# Patient Record
Sex: MALE | Race: OTHER | HISPANIC OR LATINO | URBAN - METROPOLITAN AREA
[De-identification: names, ages, dates, MRNs, and addresses within clinical notes are randomized per-mention and may not be internally consistent; named-entity substitution may affect disease eponyms.]

---

## 2021-05-29 ENCOUNTER — INPATIENT (INPATIENT)
Facility: HOSPITAL | Age: 31
LOS: 2 days | Discharge: HOME | DRG: 603 | End: 2021-06-01
Attending: INTERNAL MEDICINE | Admitting: INTERNAL MEDICINE
Payer: MEDICAID

## 2021-05-29 VITALS
TEMPERATURE: 98 F | WEIGHT: 182.98 LBS | RESPIRATION RATE: 16 BRPM | DIASTOLIC BLOOD PRESSURE: 88 MMHG | OXYGEN SATURATION: 99 % | HEART RATE: 90 BPM | SYSTOLIC BLOOD PRESSURE: 133 MMHG

## 2021-05-29 DIAGNOSIS — M70.22 OLECRANON BURSITIS, LEFT ELBOW: ICD-10-CM

## 2021-05-29 LAB
A1C WITH ESTIMATED AVERAGE GLUCOSE RESULT: 5.4 % — SIGNIFICANT CHANGE UP (ref 4–5.6)
ALBUMIN SERPL ELPH-MCNC: 3.2 G/DL — LOW (ref 3.5–5)
ALBUMIN SERPL ELPH-MCNC: 3.5 G/DL — SIGNIFICANT CHANGE UP (ref 3.5–5)
ALP SERPL-CCNC: 100 U/L — SIGNIFICANT CHANGE UP (ref 40–120)
ALP SERPL-CCNC: 88 U/L — SIGNIFICANT CHANGE UP (ref 40–120)
ALT FLD-CCNC: 22 U/L DA — SIGNIFICANT CHANGE UP (ref 10–60)
ALT FLD-CCNC: 25 U/L DA — SIGNIFICANT CHANGE UP (ref 10–60)
AMMONIA BLD-MCNC: 43 UMOL/L — HIGH (ref 11–32)
ANION GAP SERPL CALC-SCNC: 10 MMOL/L — SIGNIFICANT CHANGE UP (ref 5–17)
ANION GAP SERPL CALC-SCNC: 5 MMOL/L — SIGNIFICANT CHANGE UP (ref 5–17)
APPEARANCE UR: CLEAR — SIGNIFICANT CHANGE UP
AST SERPL-CCNC: 14 U/L — SIGNIFICANT CHANGE UP (ref 10–40)
AST SERPL-CCNC: 14 U/L — SIGNIFICANT CHANGE UP (ref 10–40)
BACTERIA # UR AUTO: ABNORMAL /HPF
BASOPHILS # BLD AUTO: 0.03 K/UL — SIGNIFICANT CHANGE UP (ref 0–0.2)
BASOPHILS # BLD AUTO: 0.04 K/UL — SIGNIFICANT CHANGE UP (ref 0–0.2)
BASOPHILS NFR BLD AUTO: 0.2 % — SIGNIFICANT CHANGE UP (ref 0–2)
BASOPHILS NFR BLD AUTO: 0.4 % — SIGNIFICANT CHANGE UP (ref 0–2)
BILIRUB SERPL-MCNC: 1 MG/DL — SIGNIFICANT CHANGE UP (ref 0.2–1.2)
BILIRUB SERPL-MCNC: 1.3 MG/DL — HIGH (ref 0.2–1.2)
BILIRUB UR-MCNC: NEGATIVE — SIGNIFICANT CHANGE UP
BUN SERPL-MCNC: 12 MG/DL — SIGNIFICANT CHANGE UP (ref 7–18)
BUN SERPL-MCNC: 14 MG/DL — SIGNIFICANT CHANGE UP (ref 7–18)
CALCIUM SERPL-MCNC: 8.7 MG/DL — SIGNIFICANT CHANGE UP (ref 8.4–10.5)
CALCIUM SERPL-MCNC: 9.4 MG/DL — SIGNIFICANT CHANGE UP (ref 8.4–10.5)
CHLORIDE SERPL-SCNC: 103 MMOL/L — SIGNIFICANT CHANGE UP (ref 96–108)
CHLORIDE SERPL-SCNC: 105 MMOL/L — SIGNIFICANT CHANGE UP (ref 96–108)
CHOLEST SERPL-MCNC: 159 MG/DL — SIGNIFICANT CHANGE UP
CO2 SERPL-SCNC: 25 MMOL/L — SIGNIFICANT CHANGE UP (ref 22–31)
CO2 SERPL-SCNC: 29 MMOL/L — SIGNIFICANT CHANGE UP (ref 22–31)
COLOR SPEC: YELLOW — SIGNIFICANT CHANGE UP
CREAT SERPL-MCNC: 0.87 MG/DL — SIGNIFICANT CHANGE UP (ref 0.5–1.3)
CREAT SERPL-MCNC: 0.89 MG/DL — SIGNIFICANT CHANGE UP (ref 0.5–1.3)
CRP SERPL-MCNC: 90 MG/L — HIGH
DIFF PNL FLD: ABNORMAL
EOSINOPHIL # BLD AUTO: 0.13 K/UL — SIGNIFICANT CHANGE UP (ref 0–0.5)
EOSINOPHIL # BLD AUTO: 0.16 K/UL — SIGNIFICANT CHANGE UP (ref 0–0.5)
EOSINOPHIL NFR BLD AUTO: 1 % — SIGNIFICANT CHANGE UP (ref 0–6)
EOSINOPHIL NFR BLD AUTO: 1.5 % — SIGNIFICANT CHANGE UP (ref 0–6)
EPI CELLS # UR: ABNORMAL /HPF
ERYTHROCYTE [SEDIMENTATION RATE] IN BLOOD: 38 MM/HR — HIGH (ref 0–15)
ESTIMATED AVERAGE GLUCOSE: 108 MG/DL — SIGNIFICANT CHANGE UP (ref 68–114)
ETHANOL SERPL-MCNC: <3 MG/DL — SIGNIFICANT CHANGE UP (ref 0–10)
FERRITIN SERPL-MCNC: 867 NG/ML — HIGH (ref 30–400)
FOLATE SERPL-MCNC: 16.9 NG/ML — SIGNIFICANT CHANGE UP
GLUCOSE SERPL-MCNC: 108 MG/DL — HIGH (ref 70–99)
GLUCOSE SERPL-MCNC: 83 MG/DL — SIGNIFICANT CHANGE UP (ref 70–99)
GLUCOSE UR QL: NEGATIVE — SIGNIFICANT CHANGE UP
HCT VFR BLD CALC: 39.3 % — SIGNIFICANT CHANGE UP (ref 39–50)
HCT VFR BLD CALC: 42.8 % — SIGNIFICANT CHANGE UP (ref 39–50)
HDLC SERPL-MCNC: 40 MG/DL — LOW
HGB BLD-MCNC: 13 G/DL — SIGNIFICANT CHANGE UP (ref 13–17)
HGB BLD-MCNC: 14.2 G/DL — SIGNIFICANT CHANGE UP (ref 13–17)
IMM GRANULOCYTES NFR BLD AUTO: 0.4 % — SIGNIFICANT CHANGE UP (ref 0–1.5)
IMM GRANULOCYTES NFR BLD AUTO: 0.5 % — SIGNIFICANT CHANGE UP (ref 0–1.5)
IRON SATN MFR SERPL: 14 % — LOW (ref 20–55)
IRON SATN MFR SERPL: 29 UG/DL — LOW (ref 65–170)
KETONES UR-MCNC: ABNORMAL
LACTATE SERPL-SCNC: 1.3 MMOL/L — SIGNIFICANT CHANGE UP (ref 0.7–2)
LEUKOCYTE ESTERASE UR-ACNC: ABNORMAL
LIPID PNL WITH DIRECT LDL SERPL: 93 MG/DL — SIGNIFICANT CHANGE UP
LYMPHOCYTES # BLD AUTO: 1.92 K/UL — SIGNIFICANT CHANGE UP (ref 1–3.3)
LYMPHOCYTES # BLD AUTO: 17.3 % — SIGNIFICANT CHANGE UP (ref 13–44)
LYMPHOCYTES # BLD AUTO: 17.4 % — SIGNIFICANT CHANGE UP (ref 13–44)
LYMPHOCYTES # BLD AUTO: 2.16 K/UL — SIGNIFICANT CHANGE UP (ref 1–3.3)
MAGNESIUM SERPL-MCNC: 2.4 MG/DL — SIGNIFICANT CHANGE UP (ref 1.6–2.6)
MCHC RBC-ENTMCNC: 30.1 PG — SIGNIFICANT CHANGE UP (ref 27–34)
MCHC RBC-ENTMCNC: 30.5 PG — SIGNIFICANT CHANGE UP (ref 27–34)
MCHC RBC-ENTMCNC: 33.1 GM/DL — SIGNIFICANT CHANGE UP (ref 32–36)
MCHC RBC-ENTMCNC: 33.2 GM/DL — SIGNIFICANT CHANGE UP (ref 32–36)
MCV RBC AUTO: 90.7 FL — SIGNIFICANT CHANGE UP (ref 80–100)
MCV RBC AUTO: 92.3 FL — SIGNIFICANT CHANGE UP (ref 80–100)
MONOCYTES # BLD AUTO: 1.13 K/UL — HIGH (ref 0–0.9)
MONOCYTES # BLD AUTO: 1.31 K/UL — HIGH (ref 0–0.9)
MONOCYTES NFR BLD AUTO: 10.3 % — SIGNIFICANT CHANGE UP (ref 2–14)
MONOCYTES NFR BLD AUTO: 10.5 % — SIGNIFICANT CHANGE UP (ref 2–14)
NEUTROPHILS # BLD AUTO: 7.71 K/UL — HIGH (ref 1.8–7.4)
NEUTROPHILS # BLD AUTO: 8.79 K/UL — HIGH (ref 1.8–7.4)
NEUTROPHILS NFR BLD AUTO: 69.9 % — SIGNIFICANT CHANGE UP (ref 43–77)
NEUTROPHILS NFR BLD AUTO: 70.6 % — SIGNIFICANT CHANGE UP (ref 43–77)
NITRITE UR-MCNC: NEGATIVE — SIGNIFICANT CHANGE UP
NON HDL CHOLESTEROL: 119 MG/DL — SIGNIFICANT CHANGE UP
NRBC # BLD: 0 /100 WBCS — SIGNIFICANT CHANGE UP (ref 0–0)
NRBC # BLD: 0 /100 WBCS — SIGNIFICANT CHANGE UP (ref 0–0)
NT-PROBNP SERPL-SCNC: 33 PG/ML — SIGNIFICANT CHANGE UP (ref 0–125)
PH UR: 6 — SIGNIFICANT CHANGE UP (ref 5–8)
PHOSPHATE SERPL-MCNC: 2.9 MG/DL — SIGNIFICANT CHANGE UP (ref 2.5–4.5)
PLATELET # BLD AUTO: 160 K/UL — SIGNIFICANT CHANGE UP (ref 150–400)
PLATELET # BLD AUTO: 182 K/UL — SIGNIFICANT CHANGE UP (ref 150–400)
POTASSIUM SERPL-MCNC: 3.7 MMOL/L — SIGNIFICANT CHANGE UP (ref 3.5–5.3)
POTASSIUM SERPL-MCNC: 4.6 MMOL/L — SIGNIFICANT CHANGE UP (ref 3.5–5.3)
POTASSIUM SERPL-SCNC: 3.7 MMOL/L — SIGNIFICANT CHANGE UP (ref 3.5–5.3)
POTASSIUM SERPL-SCNC: 4.6 MMOL/L — SIGNIFICANT CHANGE UP (ref 3.5–5.3)
PROT SERPL-MCNC: 7.3 G/DL — SIGNIFICANT CHANGE UP (ref 6–8.3)
PROT SERPL-MCNC: 8.3 G/DL — SIGNIFICANT CHANGE UP (ref 6–8.3)
PROT UR-MCNC: 15
RBC # BLD: 4.26 M/UL — SIGNIFICANT CHANGE UP (ref 4.2–5.8)
RBC # BLD: 4.26 M/UL — SIGNIFICANT CHANGE UP (ref 4.2–5.8)
RBC # BLD: 4.72 M/UL — SIGNIFICANT CHANGE UP (ref 4.2–5.8)
RBC # FLD: 12.6 % — SIGNIFICANT CHANGE UP (ref 10.3–14.5)
RBC # FLD: 12.7 % — SIGNIFICANT CHANGE UP (ref 10.3–14.5)
RBC CASTS # UR COMP ASSIST: ABNORMAL /HPF (ref 0–2)
RETICS #: 52 K/UL — SIGNIFICANT CHANGE UP (ref 25–125)
RETICS/RBC NFR: 1.2 % — SIGNIFICANT CHANGE UP (ref 0.5–2.5)
SARS-COV-2 RNA SPEC QL NAA+PROBE: SIGNIFICANT CHANGE UP
SODIUM SERPL-SCNC: 138 MMOL/L — SIGNIFICANT CHANGE UP (ref 135–145)
SODIUM SERPL-SCNC: 139 MMOL/L — SIGNIFICANT CHANGE UP (ref 135–145)
SP GR SPEC: 1.02 — SIGNIFICANT CHANGE UP (ref 1.01–1.02)
TIBC SERPL-MCNC: 200 UG/DL — LOW (ref 250–450)
TRIGL SERPL-MCNC: 129 MG/DL — SIGNIFICANT CHANGE UP
TSH SERPL-MCNC: 1.33 UU/ML — SIGNIFICANT CHANGE UP (ref 0.34–4.82)
UIBC SERPL-MCNC: 171 UG/DL — SIGNIFICANT CHANGE UP (ref 110–370)
URATE SERPL-MCNC: 5.8 MG/DL — SIGNIFICANT CHANGE UP (ref 3.4–8.8)
UROBILINOGEN FLD QL: 1
VIT B12 SERPL-MCNC: <150 PG/ML — LOW (ref 232–1245)
WBC # BLD: 11.02 K/UL — HIGH (ref 3.8–10.5)
WBC # BLD: 12.47 K/UL — HIGH (ref 3.8–10.5)
WBC # FLD AUTO: 11.02 K/UL — HIGH (ref 3.8–10.5)
WBC # FLD AUTO: 12.47 K/UL — HIGH (ref 3.8–10.5)
WBC UR QL: SIGNIFICANT CHANGE UP /HPF (ref 0–5)

## 2021-05-29 PROCEDURE — 99285 EMERGENCY DEPT VISIT HI MDM: CPT

## 2021-05-29 PROCEDURE — 93971 EXTREMITY STUDY: CPT | Mod: 26,LT

## 2021-05-29 PROCEDURE — 73080 X-RAY EXAM OF ELBOW: CPT | Mod: 26,LT

## 2021-05-29 RX ORDER — OXYCODONE HYDROCHLORIDE 5 MG/1
5 TABLET ORAL EVERY 8 HOURS
Refills: 0 | Status: DISCONTINUED | OUTPATIENT
Start: 2021-05-29 | End: 2021-06-01

## 2021-05-29 RX ORDER — ACETAMINOPHEN 500 MG
650 TABLET ORAL EVERY 4 HOURS
Refills: 0 | Status: DISCONTINUED | OUTPATIENT
Start: 2021-05-29 | End: 2021-06-01

## 2021-05-29 RX ORDER — AMPICILLIN SODIUM AND SULBACTAM SODIUM 250; 125 MG/ML; MG/ML
INJECTION, POWDER, FOR SUSPENSION INTRAMUSCULAR; INTRAVENOUS
Refills: 0 | Status: DISCONTINUED | OUTPATIENT
Start: 2021-05-29 | End: 2021-06-01

## 2021-05-29 RX ORDER — AMPICILLIN SODIUM AND SULBACTAM SODIUM 250; 125 MG/ML; MG/ML
3 INJECTION, POWDER, FOR SUSPENSION INTRAMUSCULAR; INTRAVENOUS ONCE
Refills: 0 | Status: COMPLETED | OUTPATIENT
Start: 2021-05-29 | End: 2021-05-29

## 2021-05-29 RX ORDER — KETOROLAC TROMETHAMINE 30 MG/ML
30 SYRINGE (ML) INJECTION ONCE
Refills: 0 | Status: DISCONTINUED | OUTPATIENT
Start: 2021-05-29 | End: 2021-05-29

## 2021-05-29 RX ORDER — AMPICILLIN SODIUM AND SULBACTAM SODIUM 250; 125 MG/ML; MG/ML
3 INJECTION, POWDER, FOR SUSPENSION INTRAMUSCULAR; INTRAVENOUS EVERY 6 HOURS
Refills: 0 | Status: DISCONTINUED | OUTPATIENT
Start: 2021-05-30 | End: 2021-06-01

## 2021-05-29 RX ORDER — TRAMADOL HYDROCHLORIDE 50 MG/1
50 TABLET ORAL EVERY 6 HOURS
Refills: 0 | Status: DISCONTINUED | OUTPATIENT
Start: 2021-05-29 | End: 2021-06-01

## 2021-05-29 RX ORDER — SODIUM CHLORIDE 9 MG/ML
1000 INJECTION INTRAMUSCULAR; INTRAVENOUS; SUBCUTANEOUS ONCE
Refills: 0 | Status: COMPLETED | OUTPATIENT
Start: 2021-05-29 | End: 2021-05-29

## 2021-05-29 RX ADMIN — SODIUM CHLORIDE 1000 MILLILITER(S): 9 INJECTION INTRAMUSCULAR; INTRAVENOUS; SUBCUTANEOUS at 16:07

## 2021-05-29 RX ADMIN — Medication 30 MILLIGRAM(S): at 11:08

## 2021-05-29 RX ADMIN — Medication 30 MILLIGRAM(S): at 10:46

## 2021-05-29 RX ADMIN — AMPICILLIN SODIUM AND SULBACTAM SODIUM 3 GRAM(S): 250; 125 INJECTION, POWDER, FOR SUSPENSION INTRAMUSCULAR; INTRAVENOUS at 12:19

## 2021-05-29 RX ADMIN — AMPICILLIN SODIUM AND SULBACTAM SODIUM 200 GRAM(S): 250; 125 INJECTION, POWDER, FOR SUSPENSION INTRAMUSCULAR; INTRAVENOUS at 23:34

## 2021-05-29 RX ADMIN — AMPICILLIN SODIUM AND SULBACTAM SODIUM 200 GRAM(S): 250; 125 INJECTION, POWDER, FOR SUSPENSION INTRAMUSCULAR; INTRAVENOUS at 10:55

## 2021-05-29 NOTE — ED ADULT NURSE NOTE - NSIMPLEMENTINTERV_GEN_ALL_ED
Implemented All Universal Safety Interventions:  [place] to call system. Call bell, personal items and telephone within reach. Instruct patient to call for assistance. Room bathroom lighting operational. Non-slip footwear when patient is off stretcher. Physically safe environment: no spills, clutter or unnecessary equipment. Stretcher in lowest position, wheels locked, appropriate side rails in place.

## 2021-05-29 NOTE — ED PROVIDER NOTE - CLINICAL SUMMARY MEDICAL DECISION MAKING FREE TEXT BOX
Likely bursitis and cellulitis but with significant extension. Will consider admission. Will get basic blood work, cultures, X-ray of elbow and give Unasyn antibiotics.

## 2021-05-29 NOTE — H&P ADULT - NSHPPHYSICALEXAM_GEN_ALL_CORE
Vital Signs Last 24 Hrs  T(C): 36.6 (29 May 2021 09:44), Max: 36.6 (29 May 2021 09:44)  T(F): 97.8 (29 May 2021 09:44), Max: 97.8 (29 May 2021 09:44)  HR: 90 (29 May 2021 09:44) (90 - 90)  BP: 133/88 (29 May 2021 09:44) (133/88 - 133/88)  BP(mean): --  RR: 16 (29 May 2021 09:44) (16 - 16)  SpO2: 99% (29 May 2021 09:44) (99% - 99%)    PHYSICAL EXAM:  GENERAL: NAD, speaks in full sentences, no signs of respiratory distress  HEAD:  Atraumatic, Normocephalic  EYES: EOMI, PERRLA, conjunctiva and sclera clear  NECK: Supple, No JVD  CHEST/LUNG: Clear to auscultation bilaterally; No wheeze; No crackles; No accessory muscles used  HEART: Regular rate and rhythm; No murmurs;   ABDOMEN: Soft, Nontender, Nondistended; Bowel sounds present; No guarding  EXTREMITIES:  2+ Peripheral Pulses, No cyanosis or edema  PSYCH: AAOx3  NEUROLOGY: non-focal  SKIN: No rashes or lesions Vital Signs Last 24 Hrs  T(C): 36.6 (29 May 2021 09:44), Max: 36.6 (29 May 2021 09:44)  T(F): 97.8 (29 May 2021 09:44), Max: 97.8 (29 May 2021 09:44)  HR: 90 (29 May 2021 09:44) (90 - 90)  BP: 133/88 (29 May 2021 09:44) (133/88 - 133/88)  BP(mean): --  RR: 16 (29 May 2021 09:44) (16 - 16)  SpO2: 99% (29 May 2021 09:44) (99% - 99%)    PHYSICAL EXAM:  GENERAL: NAD, speaks in full sentences, no signs of respiratory distress  HEAD:  Atraumatic, Normocephalic  EYES: EOMI, PERRLA, conjunctiva and sclera clear  NECK: Supple, No JVD  CHEST/LUNG: ; No wheeze; No crackles; No accessory muscles used  HEART: Regular rate and rhythm; No murmurs;   ABDOMEN: Soft, Nontender, Nondistended; Bowel sounds present; No guarding  EXTREMITIES:  2+ Peripheral Pulses, left arm redness 6 by 4 inches around elbow area, slightly warm. 8/10 pain, extension at elbow limited  PSYCH: AAOx3  NEUROLOGY: no-focal deficit  SKIN: No rashes or lesions

## 2021-05-29 NOTE — H&P ADULT - PROBLEM SELECTOR PLAN 3
RISK                                                          Points  [] Previous VTE                                           3  [] Thrombophilia                                        2  [] Lower limb paralysis                              2   [] Current Cancer                                       2   [x] Immobilization > 24 hrs                        1  [] ICU/CCU stay > 24 hours                       1  [] Age > 60                                                   1    score 1

## 2021-05-29 NOTE — H&P ADULT - HISTORY OF PRESENT ILLNESS
Patient is 30 year old Male with no significant PMHx/PSHx, received COVID-19 vaccine to Left shoulder 5 days ago, states the following morning noticed Left elbow redness and swelling. Patient also relates subjective chills but no other symptoms. Able to  move elbow in all range of motion but with pain to the elbow. Pt denies trauma, injuries, insect bite, burn or any other stimulus. Pt denies any chest pain, nausea, vomiting, diarrhoea, dizziness.  Patient is 30 year old Male with no significant PMHx/PSHx, received COVID-19 vaccine to Left shoulder 5 days ago in am of monday and in evening on same day hit his elbow at work and reports following morning noticed Left elbow redness and swelling. Patient also relates subjective chills but no other symptoms. Able to  move elbow in all range of motion but with pain to the elbow and cannot fully extend elbow. Pt denies insect bite, burn or any other stimulus. Pt denies any chest pain, nausea, vomiting, diarrhoea, dizziness.

## 2021-05-29 NOTE — CONSULT NOTE ADULT - ASSESSMENT
Patient is a 30y old  Male with no significant PMHx/PSHx, received COVID-19 vaccine to Left shoulder 5 days ago in am of Monday and in evening on same day hit his elbow at work and reports following morning noticed Left elbow redness and swelling. Patient also relates subjective chills but no other symptoms. Able to  move elbow in all range of motion but with pain to the elbow and cannot fully extend elbow. Pt denies insect bite, burn or any other stimulus. ON admission, he has no fever but Leukocytosis. The Xray of Left elbow shows Marked soft tissue swelling olecranon region and posterior forearm. Correlate clinically for bursitis and/or cellulitis. He has starte don Unasyn, and the ID consult requested to assist with further evaluation and antibiotic management.     # Left Elbow cellulitis  # Leukocytosis    would recommend:    1. Follow up Blood cultures  2. Keep Left elbow elevated  3. Continue Unasyn until work up is done    will follow the patient with you and make further recommendation based on the clinical course and Lab results  Thank you for the opportunity to participate in Mr. KEY's care      Attending Attestation::    Spent more than 65 minutes on total encounter, more than 50 % of the visit was spent counseling and/or coordinating care by the Attending physician.         Patient is a 30y old  Male with no significant PMHx/PSHx, received COVID-19 vaccine to Left shoulder 5 days ago in am of Monday and in evening on same day hit his elbow at work and reports following morning noticed Left elbow redness and swelling. Patient also relates subjective chills but no other symptoms. Able to  move elbow in all range of motion but with pain to the elbow and cannot fully extend elbow. Pt denies insect bite, burn or any other stimulus. ON admission, he has no fever but Leukocytosis. The Xray of Left elbow shows Marked soft tissue swelling olecranon region and posterior forearm. Correlate clinically for bursitis and/or cellulitis. He has starte don Unasyn, and the ID consult requested to assist with further evaluation and antibiotic management.     # Left Elbow cellulitis /Bursitis   # Leukocytosis    would recommend:    1. Follow up Blood cultures  2. Keep Left elbow elevated  3. Continue Unasyn  and Add Clindamycin until work up is done  4. Pain management as needed     will follow the patient with you and make further recommendation based on the clinical course and Lab results  Thank you for the opportunity to participate in Mr. KEY's care      Attending Attestation::    Spent more than 65 minutes on total encounter, more than 50 % of the visit was spent counseling and/or coordinating care by the Attending physician.

## 2021-05-29 NOTE — ED PROVIDER NOTE - OBJECTIVE STATEMENT
29 y/o M with no significant PMHx/PSHx, received COVID-19 vaccine to L shoulder 5 days ago, states the following morning noticed L elbow redness and swelling. Patient also relates subjective chills but no other symptoms. Able to range and move elbow but with pain to the elbow. Denies trauma, injuries or any other acute complaints.

## 2021-05-29 NOTE — ED PROVIDER NOTE - PROGRESS NOTE DETAILS
Spoke with collin Simmons on call. Agrees with plan and recommends likely admission; will let ortho PA know and will be seen in the ED. Reevaluated at bedside, agree w admission. Mother requested for Dr Parry (working w her PMD - Dr Kaleigh Ramirez). Discussed w Dr Parry, agree w plan. Dr Lin contacted again, will see pt in the hospital, he will contact Ortho PA for consult/ MAR aware

## 2021-05-29 NOTE — CONSULT NOTE ADULT - SUBJECTIVE AND OBJECTIVE BOX
Patient is a 30y old  Male with no significant PMHx/PSHx, received COVID-19 vaccine to Left shoulder 5 days ago in am of Monday and in evening on same day hit his elbow at work and reports following morning noticed Left elbow redness and swelling. Patient also relates subjective chills but no other symptoms. Able to  move elbow in all range of motion but with pain to the elbow and cannot fully extend elbow. Pt denies insect bite, burn or any other stimulus. ON admission, he has no fever but Leukocytosis. The Xray of Left elbow shows Marked soft tissue swelling olecranon region and posterior forearm. Correlate clinically for bursitis and/or cellulitis. He has starte don Unasyn, and the ID consult requested to assist with further evaluation and antibiotic management.       REVIEW OF SYSTEMS: Total of twelve systems have been reviewed with patient and found to be negative unless mentioned in HPI      PAST MEDICAL & SURGICAL HISTORY:  No pertinent past medical history      SOCIAL HISTORY  Alcohol: Does not drink  Tobacco: Does not smoke  Illicit substance use: None      FAMILY HISTORY: Non contributory to the present illness      ALLERGIES: No Known Allergies      Vital Signs Last 24 Hrs  T(C): 37.2 (29 May 2021 15:50), Max: 37.2 (29 May 2021 15:50)  T(F): 98.9 (29 May 2021 15:50), Max: 98.9 (29 May 2021 15:50)  HR: 69 (29 May 2021 15:50) (69 - 90)  BP: 98/54 (29 May 2021 15:50) (98/54 - 133/88)  BP(mean): --  RR: 16 (29 May 2021 15:50) (16 - 16)  SpO2: 97% (29 May 2021 15:50) (97% - 99%)      PHYSICAL EXAM:  GENERAL: Not in distress   CHEST/LUNG: Not using accessory muscles  HEART: s1 and s2 present  ABDOMEN:  Nontender and  Nondistended  EXTREMITIES: No pedal  edema  CNS: Awake and Alert      LABS:                        13.0   11.02 )-----------( 160      ( 29 May 2021 17:59 )             39.3           138  |  103  |  12  ----------------------------<  83  4.6   |  25  |  0.89    Ca    9.4      29 May 2021 10:54    TPro  8.3  /  Alb  3.5  /  TBili  1.3<H>  /  DBili  x   /  AST  14  /  ALT  25  /  AlkPhos  100        Urinalysis Basic - ( 29 May 2021 16:17 )  Color: Yellow / Appearance: Clear / S.025 / pH: x  Gluc: x / Ketone: Trace  / Bili: Negative / Urobili: 1   Blood: x / Protein: 15 / Nitrite: Negative   Leuk Esterase: Trace / RBC: 5-10 /HPF / WBC 3-5 /HPF   Sq Epi: x / Non Sq Epi: Occasional /HPF / Bacteria: Trace /HPF        MEDICATIONS  (STANDING):  ampicillin/sulbactam  IVPB        MEDICATIONS  (PRN):  acetaminophen   Tablet .. 650 milliGRAM(s) Oral every 4 hours PRN Mild Pain (1 - 3)  oxyCODONE    IR 5 milliGRAM(s) Oral every 8 hours PRN Severe Pain (7 - 10)  traMADol 50 milliGRAM(s) Oral every 6 hours PRN Moderate Pain (4 - 6)        RADIOLOGY & ADDITIONAL TESTS:    21: US Duplex Venous Upper Ext Ltd, Left (21 @ 16:56) >No evidence of LEFT upper extremity deep venous thrombosis.    21 : Xray Elbow AP + Lateral + Oblique, Left (21 @ 10:37) No fracture or dislocation. Joint spaces preserved. No focal bone destruction. Marked soft tissue swelling olecranon region and posterior forearm. Correlate clinically for bursitis and/or cellulitis. No soft tissue gas or opaque foreign body.      MICROBIOLOGY DATA:    COVID-19 PCR . (21 @ 10:54)   COVID-19 PCR: Rebecateloc           Patient is a 30y old  Male with no significant PMHx/PSHx, received COVID-19 vaccine to Left shoulder 5 days ago in am of Monday and in evening on same day hit his elbow at work and reports following morning noticed Left elbow redness and swelling. Patient also relates subjective chills but no other symptoms. Able to  move elbow in all range of motion but with pain to the elbow and cannot fully extend elbow. Pt denies insect bite, burn or any other stimulus. ON admission, he has no fever but Leukocytosis. The Xray of Left elbow shows Marked soft tissue swelling olecranon region and posterior forearm. Correlate clinically for bursitis and/or cellulitis. He has starte don Unasyn, and the ID consult requested to assist with further evaluation and antibiotic management.       REVIEW OF SYSTEMS: Total of twelve systems have been reviewed with patient and found to be negative unless mentioned in HPI      PAST MEDICAL & SURGICAL HISTORY:  No pertinent past medical history      SOCIAL HISTORY  Alcohol: Does not drink  Tobacco: Does not smoke  Illicit substance use: None      FAMILY HISTORY: Non contributory to the present illness      ALLERGIES: No Known Allergies      Vital Signs Last 24 Hrs  T(C): 37.2 (29 May 2021 15:50), Max: 37.2 (29 May 2021 15:50)  T(F): 98.9 (29 May 2021 15:50), Max: 98.9 (29 May 2021 15:50)  HR: 69 (29 May 2021 15:50) (69 - 90)  BP: 98/54 (29 May 2021 15:50) (98/54 - 133/88)  BP(mean): --  RR: 16 (29 May 2021 15:50) (16 - 16)  SpO2: 97% (29 May 2021 15:50) (97% - 99%)      PHYSICAL EXAM:  GENERAL: Not in distress   CHEST/LUNG: Not using accessory muscles  HEART: s1 and s2 present  ABDOMEN:  Nontender and  Nondistended  EXTREMITIES: Left elbow red, swollen, warmth and tender to touch   CNS: Awake and Alert      LABS:                        13.0   11.02 )-----------( 160      ( 29 May 2021 17:59 )             39.3       05-    138  |  103  |  12  ----------------------------<  83  4.6   |  25  |  0.89    Ca    9.4      29 May 2021 10:54    TPro  8.3  /  Alb  3.5  /  TBili  1.3<H>  /  DBili  x   /  AST  14  /  ALT  25  /  AlkPhos  100  05-      Urinalysis Basic - ( 29 May 2021 16:17 )  Color: Yellow / Appearance: Clear / S.025 / pH: x  Gluc: x / Ketone: Trace  / Bili: Negative / Urobili: 1   Blood: x / Protein: 15 / Nitrite: Negative   Leuk Esterase: Trace / RBC: 5-10 /HPF / WBC 3-5 /HPF   Sq Epi: x / Non Sq Epi: Occasional /HPF / Bacteria: Trace /HPF        MEDICATIONS  (STANDING):  ampicillin/sulbactam  IVPB        MEDICATIONS  (PRN):  acetaminophen   Tablet .. 650 milliGRAM(s) Oral every 4 hours PRN Mild Pain (1 - 3)  oxyCODONE    IR 5 milliGRAM(s) Oral every 8 hours PRN Severe Pain (7 - 10)  traMADol 50 milliGRAM(s) Oral every 6 hours PRN Moderate Pain (4 - 6)        RADIOLOGY & ADDITIONAL TESTS:    21: US Duplex Venous Upper Ext Ltd, Left (21 @ 16:56) >No evidence of LEFT upper extremity deep venous thrombosis.    21 : Xray Elbow AP + Lateral + Oblique, Left (21 @ 10:37) No fracture or dislocation. Joint spaces preserved. No focal bone destruction. Marked soft tissue swelling olecranon region and posterior forearm. Correlate clinically for bursitis and/or cellulitis. No soft tissue gas or opaque foreign body.      MICROBIOLOGY DATA:    COVID-19 PCR . (21 @ 10:54)   COVID-19 PCR: NotDetec

## 2021-05-29 NOTE — H&P ADULT - PROBLEM SELECTOR PLAN 2
- p/w B/L  Cellulitis  of left arm  - warm to touch with some tenderness  - s/p unasyn in ED  - Leukocytosis 12, tachy to 90s, lactate 1.3 with known source meet sepsis criterion  - will continue unasyn  - Recommended arm elevation  - lactate 1.3  - c/w pain meds Tylenol q6 while awake   - f/u Doppler to r/o DVT  - f/u ESR , CRP  - f/u Blood Cx (Specimen received)  - f/u Urine Cx (Specimen received)  - f/u PT.   - f/u ortho  - ID dR. AMIN

## 2021-05-29 NOTE — ED PROVIDER NOTE - MUSCULOSKELETAL MINIMAL EXAM
diffuse swelling to posterior aspect of L elbow with mild effusion around the olecranon, redness, induration and warmth extends from mid arm to posterior aspect and mid forearm, full range of motion in elbow with flexion, extension, supination and pronation without pain, neurovascularly intact

## 2021-05-29 NOTE — H&P ADULT - ASSESSMENT
Patient is 30 year old Male with no significant PMHx/PSHx, received COVID-19 vaccine to Left shoulder 5 days ago, states the following morning noticed Left elbow redness and swelling. Pt admitted for cellulitis Patient is 30 year old Male with no significant PMHx/PSHx, received COVID-19 vaccine to Left shoulder 5 days ago, states the following morning noticed Left elbow redness and swelling. Pt admitted for cellulitis.

## 2021-05-29 NOTE — ED PROVIDER NOTE - CARE PLAN
Principal Discharge DX:	Olecranon bursitis of left elbow  Secondary Diagnosis:	Cellulitis of left upper extremity

## 2021-05-29 NOTE — H&P ADULT - ATTENDING COMMENTS
Patient is 30 year old Male with no significant PMHx/PSHx, received COVID-19 vaccine to Left shoulder 5 days ago in am of monday and in evening on same day hit his elbow at work and reports following morning noticed Left elbow redness and swelling. Patient also relates subjective chills but no other symptoms. Able to  move elbow in all range of motion but with pain to the elbow and cannot fully extend elbow. Pt denies insect bite, burn or any other stimulus. Pt denies any chest pain, nausea, vomiting, diarrhoea, dizziness.       assessment  --  sepsis, left ue cellulitis, left olecron bursitis, h/o COVID-19 vaccine to Left shoulder 5 days ago    plan  --  admit to med, unasyn, motrin prn pain gi and dvt prophylaxis,  cbc, bmp, mg, phos, lipids, tsh, bld cx, ua, ucx,      id cons  ortho cons

## 2021-05-29 NOTE — H&P ADULT - PROBLEM SELECTOR PLAN 1
- p/w B/L  Cellulitis  of left arm  - warm to touch with some tenderness  - s/p unasyn in ED  - Leukocytosis 12, tachy to 90s, lactate 1.3 with known source meet sepsis criterion  - will continue unasyn  - Recommended arm elevation  - lactate 1.3  - c/w pain meds Tylenol q6 while awake   - f/u Doppler to r/o DVT  - f/u ESR , CRP  - f/u Blood Cx (Specimen received)  - f/u Urine Cx (Specimen received)  - f/u PT.   - f/u ortho  - ID dR. AMIN - p/w B/L  Cellulitis  of left arm  - warm to touch with some tenderness  - s/p unasyn in ED  - Leukocytosis 12, tachy to 90s, lactate 1.3 with known source meet sepsis criterion  - will continue unasyn  - Recommended arm elevation  - lactate 1.3  - c/w pain meds Tylenol q6 while awake   - f/u Doppler to r/o DVT  - f/u ESR , CRP  - f/u Blood Cx (Specimen received)  - f/u Urine Cx (Specimen received)  - f/u PT.   - f/u ortho, consulted by ed   - ID dR. BEAN

## 2021-05-30 LAB
ALBUMIN SERPL ELPH-MCNC: 3.1 G/DL — LOW (ref 3.5–5)
ALP SERPL-CCNC: 83 U/L — SIGNIFICANT CHANGE UP (ref 40–120)
ALT FLD-CCNC: 20 U/L DA — SIGNIFICANT CHANGE UP (ref 10–60)
ANION GAP SERPL CALC-SCNC: 6 MMOL/L — SIGNIFICANT CHANGE UP (ref 5–17)
AST SERPL-CCNC: 14 U/L — SIGNIFICANT CHANGE UP (ref 10–40)
BASOPHILS # BLD AUTO: 0.04 K/UL — SIGNIFICANT CHANGE UP (ref 0–0.2)
BASOPHILS NFR BLD AUTO: 0.4 % — SIGNIFICANT CHANGE UP (ref 0–2)
BILIRUB SERPL-MCNC: 1.1 MG/DL — SIGNIFICANT CHANGE UP (ref 0.2–1.2)
BUN SERPL-MCNC: 10 MG/DL — SIGNIFICANT CHANGE UP (ref 7–18)
CALCIUM SERPL-MCNC: 8.8 MG/DL — SIGNIFICANT CHANGE UP (ref 8.4–10.5)
CHLORIDE SERPL-SCNC: 107 MMOL/L — SIGNIFICANT CHANGE UP (ref 96–108)
CO2 SERPL-SCNC: 26 MMOL/L — SIGNIFICANT CHANGE UP (ref 22–31)
COVID-19 SPIKE DOMAIN AB INTERP: POSITIVE
COVID-19 SPIKE DOMAIN ANTIBODY RESULT: >250 U/ML — HIGH
CREAT SERPL-MCNC: 0.74 MG/DL — SIGNIFICANT CHANGE UP (ref 0.5–1.3)
CULTURE RESULTS: NO GROWTH — SIGNIFICANT CHANGE UP
EOSINOPHIL # BLD AUTO: 0.17 K/UL — SIGNIFICANT CHANGE UP (ref 0–0.5)
EOSINOPHIL NFR BLD AUTO: 1.8 % — SIGNIFICANT CHANGE UP (ref 0–6)
GLUCOSE SERPL-MCNC: 95 MG/DL — SIGNIFICANT CHANGE UP (ref 70–99)
HCT VFR BLD CALC: 40.4 % — SIGNIFICANT CHANGE UP (ref 39–50)
HGB BLD-MCNC: 13.4 G/DL — SIGNIFICANT CHANGE UP (ref 13–17)
IMM GRANULOCYTES NFR BLD AUTO: 0.6 % — SIGNIFICANT CHANGE UP (ref 0–1.5)
LYMPHOCYTES # BLD AUTO: 1.79 K/UL — SIGNIFICANT CHANGE UP (ref 1–3.3)
LYMPHOCYTES # BLD AUTO: 18.8 % — SIGNIFICANT CHANGE UP (ref 13–44)
MCHC RBC-ENTMCNC: 30.2 PG — SIGNIFICANT CHANGE UP (ref 27–34)
MCHC RBC-ENTMCNC: 33.2 GM/DL — SIGNIFICANT CHANGE UP (ref 32–36)
MCV RBC AUTO: 91 FL — SIGNIFICANT CHANGE UP (ref 80–100)
MONOCYTES # BLD AUTO: 0.99 K/UL — HIGH (ref 0–0.9)
MONOCYTES NFR BLD AUTO: 10.4 % — SIGNIFICANT CHANGE UP (ref 2–14)
NEUTROPHILS # BLD AUTO: 6.49 K/UL — SIGNIFICANT CHANGE UP (ref 1.8–7.4)
NEUTROPHILS NFR BLD AUTO: 68 % — SIGNIFICANT CHANGE UP (ref 43–77)
NRBC # BLD: 0 /100 WBCS — SIGNIFICANT CHANGE UP (ref 0–0)
PLATELET # BLD AUTO: 183 K/UL — SIGNIFICANT CHANGE UP (ref 150–400)
POTASSIUM SERPL-MCNC: 3.9 MMOL/L — SIGNIFICANT CHANGE UP (ref 3.5–5.3)
POTASSIUM SERPL-SCNC: 3.9 MMOL/L — SIGNIFICANT CHANGE UP (ref 3.5–5.3)
PROT SERPL-MCNC: 7.4 G/DL — SIGNIFICANT CHANGE UP (ref 6–8.3)
RBC # BLD: 4.44 M/UL — SIGNIFICANT CHANGE UP (ref 4.2–5.8)
RBC # FLD: 12.5 % — SIGNIFICANT CHANGE UP (ref 10.3–14.5)
SARS-COV-2 IGG+IGM SERPL QL IA: >250 U/ML — HIGH
SARS-COV-2 IGG+IGM SERPL QL IA: POSITIVE
SODIUM SERPL-SCNC: 139 MMOL/L — SIGNIFICANT CHANGE UP (ref 135–145)
SPECIMEN SOURCE: SIGNIFICANT CHANGE UP
WBC # BLD: 9.54 K/UL — SIGNIFICANT CHANGE UP (ref 3.8–10.5)
WBC # FLD AUTO: 9.54 K/UL — SIGNIFICANT CHANGE UP (ref 3.8–10.5)

## 2021-05-30 RX ADMIN — Medication 100 MILLIGRAM(S): at 05:11

## 2021-05-30 RX ADMIN — Medication 100 MILLIGRAM(S): at 21:39

## 2021-05-30 RX ADMIN — Medication 100 MILLIGRAM(S): at 02:16

## 2021-05-30 RX ADMIN — AMPICILLIN SODIUM AND SULBACTAM SODIUM 200 GRAM(S): 250; 125 INJECTION, POWDER, FOR SUSPENSION INTRAMUSCULAR; INTRAVENOUS at 11:18

## 2021-05-30 RX ADMIN — AMPICILLIN SODIUM AND SULBACTAM SODIUM 200 GRAM(S): 250; 125 INJECTION, POWDER, FOR SUSPENSION INTRAMUSCULAR; INTRAVENOUS at 06:01

## 2021-05-30 RX ADMIN — AMPICILLIN SODIUM AND SULBACTAM SODIUM 200 GRAM(S): 250; 125 INJECTION, POWDER, FOR SUSPENSION INTRAMUSCULAR; INTRAVENOUS at 23:10

## 2021-05-30 RX ADMIN — OXYCODONE HYDROCHLORIDE 5 MILLIGRAM(S): 5 TABLET ORAL at 15:12

## 2021-05-30 RX ADMIN — OXYCODONE HYDROCHLORIDE 5 MILLIGRAM(S): 5 TABLET ORAL at 16:12

## 2021-05-30 RX ADMIN — Medication 100 MILLIGRAM(S): at 13:03

## 2021-05-30 RX ADMIN — AMPICILLIN SODIUM AND SULBACTAM SODIUM 200 GRAM(S): 250; 125 INJECTION, POWDER, FOR SUSPENSION INTRAMUSCULAR; INTRAVENOUS at 17:00

## 2021-05-30 NOTE — PROGRESS NOTE ADULT - SUBJECTIVE AND OBJECTIVE BOX
Patient is a 30y old  Male who presents with a chief complaint of cellulitis (29 May 2021 18:10)    pt seen in icu [  ], reg med floor [   ], bed [  ], chair at bedside [   ], a+o x3 [  ], lethargic [  ],  nad [  ]    valerio [  ], ngt [  ], peg [  ], et tube [  ], cent line [  ], picc line [  ]        Allergies    No Known Allergies        Vitals    T(F): 97.9 (05-30-21 @ 05:10), Max: 98.9 (05-29-21 @ 15:50)  HR: 70 (05-30-21 @ 05:10) (69 - 90)  BP: 108/62 (05-30-21 @ 05:10) (98/54 - 154/72)  RR: 15 (05-30-21 @ 05:10) (15 - 16)  SpO2: 96% (05-30-21 @ 05:10) (96% - 99%)  Wt(kg): --  CAPILLARY BLOOD GLUCOSE          Labs                          13.0   11.02 )-----------( 160      ( 29 May 2021 17:59 )             39.3       05-29    139  |  105  |  14  ----------------------------<  108<H>  3.7   |  29  |  0.87    Ca    8.7      29 May 2021 17:59  Phos  2.9     05-29  Mg     2.4     05-29    TPro  7.3  /  Alb  3.2<L>  /  TBili  1.0  /  DBili  x   /  AST  14  /  ALT  22  /  AlkPhos  88  05-29                Radiology Results      Meds    MEDICATIONS  (STANDING):  ampicillin/sulbactam  IVPB      ampicillin/sulbactam  IVPB 3 Gram(s) IV Intermittent every 6 hours  clindamycin IVPB      clindamycin IVPB 900 milliGRAM(s) IV Intermittent every 8 hours      MEDICATIONS  (PRN):  acetaminophen   Tablet .. 650 milliGRAM(s) Oral every 4 hours PRN Mild Pain (1 - 3)  oxyCODONE    IR 5 milliGRAM(s) Oral every 8 hours PRN Severe Pain (7 - 10)  traMADol 50 milliGRAM(s) Oral every 6 hours PRN Moderate Pain (4 - 6)      Physical Exam    Neuro :  no focal deficits  Respiratory: CTA B/L  CV: RRR, S1S2, no murmurs,   Abdominal: Soft, NT, ND +BS,  Extremities: No edema, + peripheral pulses    ASSESSMENT    Olecranon bursitis of left elbow    No pertinent past medical history        PLAN     Patient is a 30y old  Male who presents with a chief complaint of cellulitis (29 May 2021 18:10)    pt seen in icu [  ], reg med floor [ x  ], bed [ x ], chair at bedside [   ], a+o x3 [ x ], lethargic [  ],  nad [ x ]        Allergies    No Known Allergies        Vitals    T(F): 97.9 (05-30-21 @ 05:10), Max: 98.9 (05-29-21 @ 15:50)  HR: 70 (05-30-21 @ 05:10) (69 - 90)  BP: 108/62 (05-30-21 @ 05:10) (98/54 - 154/72)  RR: 15 (05-30-21 @ 05:10) (15 - 16)  SpO2: 96% (05-30-21 @ 05:10) (96% - 99%)  Wt(kg): --  CAPILLARY BLOOD GLUCOSE          Labs                          13.0   11.02 )-----------( 160      ( 29 May 2021 17:59 )             39.3       05-29    139  |  105  |  14  ----------------------------<  108<H>  3.7   |  29  |  0.87    Ca    8.7      29 May 2021 17:59  Phos  2.9     05-29  Mg     2.4     05-29    TPro  7.3  /  Alb  3.2<L>  /  TBili  1.0  /  DBili  x   /  AST  14  /  ALT  22  /  AlkPhos  88  05-29                Radiology Results      Meds    MEDICATIONS  (STANDING):  ampicillin/sulbactam  IVPB      ampicillin/sulbactam  IVPB 3 Gram(s) IV Intermittent every 6 hours  clindamycin IVPB      clindamycin IVPB 900 milliGRAM(s) IV Intermittent every 8 hours      MEDICATIONS  (PRN):  acetaminophen   Tablet .. 650 milliGRAM(s) Oral every 4 hours PRN Mild Pain (1 - 3)  oxyCODONE    IR 5 milliGRAM(s) Oral every 8 hours PRN Severe Pain (7 - 10)  traMADol 50 milliGRAM(s) Oral every 6 hours PRN Moderate Pain (4 - 6)      Physical Exam    Neuro :  no focal deficits  Respiratory: CTA B/L  CV: RRR, S1S2, no murmurs,   Abdominal: Soft, NT, ND +BS,  Extremities: No le edema, + peripheral pulses, mild lue edema and erythema, full rom, olecranon tenderness    ASSESSMENT    Olecranon bursitis of left elbow  sepsis,   left ue cellulitis,   h/o COVID-19 vaccine to Left shoulder 5 days ago        PLAN    cont unasyn,   id f/u   f/u blood cx  cont pain control  ortho cons  cont current meds,

## 2021-05-30 NOTE — PROGRESS NOTE ADULT - ASSESSMENT
Patient is a 30y old  Male with no significant PMHx/PSHx, received COVID-19 vaccine to Left shoulder 5 days ago in am of Monday and in evening on same day hit his elbow at work and reports following morning noticed Left elbow redness and swelling. Patient also relates subjective chills but no other symptoms. Able to  move elbow in all range of motion but with pain to the elbow and cannot fully extend elbow. Pt denies insect bite, burn or any other stimulus. ON admission, he has no fever but Leukocytosis. The Xray of Left elbow shows Marked soft tissue swelling olecranon region and posterior forearm. Correlate clinically for bursitis and/or cellulitis. He has starte don Unasyn, and the ID consult requested to assist with further evaluation and antibiotic management.     # Left Elbow cellulitis /Bursitis   # Leukocytosis    would recommend:    1. Follow up Blood cultures  2. Keep Left elbow elevated  3. Continue Unasyn  and Add Clindamycin until work up is done  4. Pain management as needed       Attending Attestation::    Spent more than 45 minutes on total encounter, more than 50 % of the visit was spent counseling and/or coordinating care by the Attending physician. Patient is a 30y old  Male with no significant PMHx/PSHx, received COVID-19 vaccine to Left shoulder 5 days ago in am of Monday and in evening on same day hit his elbow at work and reports following morning noticed Left elbow redness and swelling. Patient also relates subjective chills but no other symptoms. Able to  move elbow in all range of motion but with pain to the elbow and cannot fully extend elbow. Pt denies insect bite, burn or any other stimulus. ON admission, he has no fever but Leukocytosis. The Xray of Left elbow shows Marked soft tissue swelling olecranon region and posterior forearm. Correlate clinically for bursitis and/or cellulitis. He has starte don Unasyn, and the ID consult requested to assist with further evaluation and antibiotic management.     # Left Elbow cellulitis /Bursitis   # Leukocytosis  - resolved     would recommend:    1. Follow up Blood cultures, is in process   2. Keep Left elbow elevated and if no improvement then please obtain sonogram of Left elbow to rule out fluid collection   3. Continue Unasyn and Clindamycin until work up is done  4. Pain management as needed     d/w patient and Family at the bed side     Attending Attestation::    Spent more than 45 minutes on total encounter, more than 50 % of the visit was spent counseling and/or coordinating care by the Attending physician.

## 2021-05-30 NOTE — PROGRESS NOTE ADULT - SUBJECTIVE AND OBJECTIVE BOX
Patient is seen and examined at the bed side, is afebrile. The Blood cultures are in process.       REVIEW OF SYSTEMS: All other review systems are negative      ALLERGIES: No Known Allergies      Vital Signs Last 24 Hrs  T(C): 36.9 (30 May 2021 14:03), Max: 37.2 (29 May 2021 15:50)  T(F): 98.4 (30 May 2021 14:03), Max: 98.9 (29 May 2021 15:50)  HR: 69 (30 May 2021 14:03) (69 - 74)  BP: 104/74 (30 May 2021 14:03) (98/54 - 154/72)  BP(mean): --  RR: 17 (30 May 2021 14:03) (15 - 17)  SpO2: 97% (30 May 2021 14:03) (96% - 97%)      PHYSICAL EXAM:  GENERAL: Not in distress   CHEST/LUNG: Not using accessory muscles  HEART: s1 and s2 present  ABDOMEN:  Nontender and  Nondistended  EXTREMITIES: Left elbow red, swollen, warmth and tender to touch   CNS: Awake and Alert      LABS:                        13.4   9.54  )-----------( 183      ( 30 May 2021 07:42 )             40.4                           13.0   11.02 )-----------( 160      ( 29 May 2021 17:59 )             39.3       05    139  |  107  |  10  ----------------------------<  95  3.9   |  26  |  0.74    Ca    8.8      30 May 2021 07:42  Phos  2.9       Mg     2.4         TPro  7.4  /  Alb  3.1<L>  /  TBili  1.1  /  DBili  x   /  AST  14  /  ALT  20  /  AlkPhos  83      138  |  103  |  12  ----------------------------<  83  4.6   |  25  |  0.89    Ca    9.4      29 May 2021 10:54    TPro  8.3  /  Alb  3.5  /  TBili  1.3<H>  /  DBili  x   /  AST  14  /  ALT  25  /  AlkPhos  100        Urinalysis Basic - ( 29 May 2021 16:17 )  Color: Yellow / Appearance: Clear / S.025 / pH: x  Gluc: x / Ketone: Trace  / Bili: Negative / Urobili: 1   Blood: x / Protein: 15 / Nitrite: Negative   Leuk Esterase: Trace / RBC: 5-10 /HPF / WBC 3-5 /HPF   Sq Epi: x / Non Sq Epi: Occasional /HPF / Bacteria: Trace /HPF        MEDICATIONS  (STANDING):    ampicillin/sulbactam  IVPB      ampicillin/sulbactam  IVPB 3 Gram(s) IV Intermittent every 6 hours  clindamycin IVPB      clindamycin IVPB 900 milliGRAM(s) IV Intermittent every 8 hours        RADIOLOGY & ADDITIONAL TESTS:    21: US Duplex Venous Upper Ext Ltd, Left (21 @ 16:56) >No evidence of LEFT upper extremity deep venous thrombosis.    21 : Xray Elbow AP + Lateral + Oblique, Left (21 @ 10:37) No fracture or dislocation. Joint spaces preserved. No focal bone destruction. Marked soft tissue swelling olecranon region and posterior forearm. Correlate clinically for bursitis and/or cellulitis. No soft tissue gas or opaque foreign body.        MICROBIOLOGY DATA:      COVID-19 PCR . (21 @ 10:54)   COVID-19 PCR: NotDetec             Patient is seen and examined at the bed side, is afebrile. The Blood cultures are in process. The Left elbow still swollen and tender.       REVIEW OF SYSTEMS: All other review systems are negative      ALLERGIES: No Known Allergies      Vital Signs Last 24 Hrs  T(C): 36.9 (30 May 2021 14:03), Max: 37.2 (29 May 2021 15:50)  T(F): 98.4 (30 May 2021 14:03), Max: 98.9 (29 May 2021 15:50)  HR: 69 (30 May 2021 14:03) (69 - 74)  BP: 104/74 (30 May 2021 14:03) (98/54 - 154/72)  BP(mean): --  RR: 17 (30 May 2021 14:) (15 - 17)  SpO2: 97% (30 May 2021 14:03) (96% - 97%)      PHYSICAL EXAM:  GENERAL: Not in distress   CHEST/LUNG: Not using accessory muscles  HEART: s1 and s2 present  ABDOMEN:  Nontender and  Nondistended  EXTREMITIES: Left elbow red, swollen, warmth and tender to touch   CNS: Awake and Alert      LABS:                        13.4   9.54  )-----------( 183      ( 30 May 2021 07:42 )             40.4                           13.0   11.02 )-----------( 160      ( 29 May 2021 17:59 )             39.3           139  |  107  |  10  ----------------------------<  95  3.9   |  26  |  0.74    Ca    8.8      30 May 2021 07:42  Phos  2.9       Mg     2.4         TPro  7.4  /  Alb  3.1<L>  /  TBili  1.1  /  DBili  x   /  AST  14  /  ALT  20  /  AlkPhos  83      138  |  103  |  12  ----------------------------<  83  4.6   |  25  |  0.89    Ca    9.4      29 May 2021 10:54    TPro  8.3  /  Alb  3.5  /  TBili  1.3<H>  /  DBili  x   /  AST  14  /  ALT  25  /  AlkPhos  100        Urinalysis Basic - ( 29 May 2021 16:17 )  Color: Yellow / Appearance: Clear / S.025 / pH: x  Gluc: x / Ketone: Trace  / Bili: Negative / Urobili: 1   Blood: x / Protein: 15 / Nitrite: Negative   Leuk Esterase: Trace / RBC: 5-10 /HPF / WBC 3-5 /HPF   Sq Epi: x / Non Sq Epi: Occasional /HPF / Bacteria: Trace /HPF        MEDICATIONS  (STANDING):    ampicillin/sulbactam  IVPB      ampicillin/sulbactam  IVPB 3 Gram(s) IV Intermittent every 6 hours  clindamycin IVPB      clindamycin IVPB 900 milliGRAM(s) IV Intermittent every 8 hours        RADIOLOGY & ADDITIONAL TESTS:    21: US Duplex Venous Upper Ext Ltd, Left (21 @ 16:56) >No evidence of LEFT upper extremity deep venous thrombosis.    21 : Xray Elbow AP + Lateral + Oblique, Left (21 @ 10:37) No fracture or dislocation. Joint spaces preserved. No focal bone destruction. Marked soft tissue swelling olecranon region and posterior forearm. Correlate clinically for bursitis and/or cellulitis. No soft tissue gas or opaque foreign body.        MICROBIOLOGY DATA:  COVID-19 PCR . (21 @ 10:54)   COVID-19 PCR: NotDetec

## 2021-05-31 RX ADMIN — Medication 100 MILLIGRAM(S): at 05:10

## 2021-05-31 RX ADMIN — Medication 100 MILLIGRAM(S): at 14:47

## 2021-05-31 RX ADMIN — AMPICILLIN SODIUM AND SULBACTAM SODIUM 200 GRAM(S): 250; 125 INJECTION, POWDER, FOR SUSPENSION INTRAMUSCULAR; INTRAVENOUS at 05:10

## 2021-05-31 RX ADMIN — AMPICILLIN SODIUM AND SULBACTAM SODIUM 200 GRAM(S): 250; 125 INJECTION, POWDER, FOR SUSPENSION INTRAMUSCULAR; INTRAVENOUS at 17:13

## 2021-05-31 RX ADMIN — AMPICILLIN SODIUM AND SULBACTAM SODIUM 200 GRAM(S): 250; 125 INJECTION, POWDER, FOR SUSPENSION INTRAMUSCULAR; INTRAVENOUS at 23:04

## 2021-05-31 RX ADMIN — AMPICILLIN SODIUM AND SULBACTAM SODIUM 200 GRAM(S): 250; 125 INJECTION, POWDER, FOR SUSPENSION INTRAMUSCULAR; INTRAVENOUS at 12:14

## 2021-05-31 RX ADMIN — OXYCODONE HYDROCHLORIDE 5 MILLIGRAM(S): 5 TABLET ORAL at 14:52

## 2021-05-31 RX ADMIN — OXYCODONE HYDROCHLORIDE 5 MILLIGRAM(S): 5 TABLET ORAL at 15:57

## 2021-05-31 RX ADMIN — Medication 100 MILLIGRAM(S): at 21:21

## 2021-05-31 NOTE — PROGRESS NOTE ADULT - SUBJECTIVE AND OBJECTIVE BOX
Patient is a 30y old  Male who presents with a chief complaint of cellulitis (30 May 2021 15:41)    pt seen in icu [  ], reg med floor [ x  ], bed [ x ], chair at bedside [   ], a+o x3 [ x ], lethargic [  ],    nad [ x ]      Allergies    No Known Allergies        Vitals    T(F): 98.1 (05-31-21 @ 05:00), Max: 98.4 (05-30-21 @ 14:03)  HR: 71 (05-31-21 @ 05:00) (69 - 71)  BP: 121/68 (05-31-21 @ 05:00) (104/74 - 121/68)  RR: 17 (05-31-21 @ 05:00) (17 - 17)  SpO2: 95% (05-31-21 @ 05:00) (95% - 97%)  Wt(kg): --  CAPILLARY BLOOD GLUCOSE          Labs                          13.4   9.54  )-----------( 183      ( 30 May 2021 07:42 )             40.4       05-30    139  |  107  |  10  ----------------------------<  95  3.9   |  26  |  0.74    Ca    8.8      30 May 2021 07:42  Phos  2.9     05-29  Mg     2.4     05-29    TPro  7.4  /  Alb  3.1<L>  /  TBili  1.1  /  DBili  x   /  AST  14  /  ALT  20  /  AlkPhos  83  05-30            .Blood Blood-Venous  05-29 @ 20:58   No growth to date.  --  --      .Urine Clean Catch (Midstream)  05-29 @ 20:57   No growth  --  --          Radiology Results      Meds    MEDICATIONS  (STANDING):  ampicillin/sulbactam  IVPB      ampicillin/sulbactam  IVPB 3 Gram(s) IV Intermittent every 6 hours  clindamycin IVPB      clindamycin IVPB 900 milliGRAM(s) IV Intermittent every 8 hours      MEDICATIONS  (PRN):  acetaminophen   Tablet .. 650 milliGRAM(s) Oral every 4 hours PRN Mild Pain (1 - 3)  oxyCODONE    IR 5 milliGRAM(s) Oral every 8 hours PRN Severe Pain (7 - 10)  traMADol 50 milliGRAM(s) Oral every 6 hours PRN Moderate Pain (4 - 6)      Physical Exam    Neuro :  no focal deficits  Respiratory: CTA B/L  CV: RRR, S1S2, no murmurs,   Abdominal: Soft, NT, ND +BS,  Extremities: No le edema, + peripheral pulses, mild lue edema and erythema, full rom, olecranon tenderness    ASSESSMENT    Olecranon bursitis of left elbow  sepsis,   left ue cellulitis,   h/o COVID-19 vaccine to Left shoulder 5 days ago        PLAN    cont unasyn,   id f/u   f/u blood cx  cont pain control  ortho cons  cont current meds,       Patient is a 30y old  Male who presents with a chief complaint of cellulitis (30 May 2021 15:41)    pt seen in icu [  ], reg med floor [ x  ], bed [ x ], chair at bedside [   ], a+o x3 [ x ], lethargic [  ],    nad [ x ]      Allergies    No Known Allergies        Vitals    T(F): 98.1 (05-31-21 @ 05:00), Max: 98.4 (05-30-21 @ 14:03)  HR: 71 (05-31-21 @ 05:00) (69 - 71)  BP: 121/68 (05-31-21 @ 05:00) (104/74 - 121/68)  RR: 17 (05-31-21 @ 05:00) (17 - 17)  SpO2: 95% (05-31-21 @ 05:00) (95% - 97%)  Wt(kg): --  CAPILLARY BLOOD GLUCOSE          Labs                          13.4   9.54  )-----------( 183      ( 30 May 2021 07:42 )             40.4       05-30    139  |  107  |  10  ----------------------------<  95  3.9   |  26  |  0.74    Ca    8.8      30 May 2021 07:42  Phos  2.9     05-29  Mg     2.4     05-29    TPro  7.4  /  Alb  3.1<L>  /  TBili  1.1  /  DBili  x   /  AST  14  /  ALT  20  /  AlkPhos  83  05-30            .Blood Blood-Venous  05-29 @ 20:58   No growth to date.  --  --      .Urine Clean Catch (Midstream)  05-29 @ 20:57   No growth  --  --          Radiology Results      Meds    MEDICATIONS  (STANDING):  ampicillin/sulbactam  IVPB      ampicillin/sulbactam  IVPB 3 Gram(s) IV Intermittent every 6 hours  clindamycin IVPB      clindamycin IVPB 900 milliGRAM(s) IV Intermittent every 8 hours      MEDICATIONS  (PRN):  acetaminophen   Tablet .. 650 milliGRAM(s) Oral every 4 hours PRN Mild Pain (1 - 3)  oxyCODONE    IR 5 milliGRAM(s) Oral every 8 hours PRN Severe Pain (7 - 10)  traMADol 50 milliGRAM(s) Oral every 6 hours PRN Moderate Pain (4 - 6)      Physical Exam    Neuro :  no focal deficits  Respiratory: CTA B/L  CV: RRR, S1S2, no murmurs,   Abdominal: Soft, NT, ND +BS,  Extremities: No le edema, + peripheral pulses, lue edema and erythema much improved, full rom,      ASSESSMENT    Olecranon bursitis of left elbow  sepsis,   left ue cellulitis,   h/o COVID-19 vaccine to Left shoulder 5 days ago        PLAN    cont unasyn and clinda,   id f/u    blood and ucx neg noted above  cont pain control  cont current meds,  d/c plan once cleared by id

## 2021-05-31 NOTE — DISCHARGE NOTE PROVIDER - NSDCMRMEDTOKEN_GEN_ALL_CORE_FT
acetaminophen 325 mg oral tablet: 2 tab(s) orally every 6 hours, As Needed - 3)  Augmentin 875 mg-125 mg oral tablet: 1 tab(s) orally every 12 hours   doxycycline hyclate 100 mg oral capsule: 1 cap(s) orally every 12 hours

## 2021-05-31 NOTE — PROGRESS NOTE ADULT - ASSESSMENT
Patient is a 30y old  Male with no significant PMHx/PSHx, received COVID-19 vaccine to Left shoulder 5 days ago in am of Monday and in evening on same day hit his elbow at work and reports following morning noticed Left elbow redness and swelling. Patient also relates subjective chills but no other symptoms. Able to  move elbow in all range of motion but with pain to the elbow and cannot fully extend elbow. Pt denies insect bite, burn or any other stimulus. ON admission, he has no fever but Leukocytosis. The Xray of Left elbow shows Marked soft tissue swelling olecranon region and posterior forearm. Correlate clinically for bursitis and/or cellulitis. He has starte don Unasyn, and the ID consult requested to assist with further evaluation and antibiotic management.     # Left Elbow cellulitis /Bursitis   # Leukocytosis  - resolved     would recommend:    1.  Continue Unasyn and Clindamycin until work up is done  2. Keep Left elbow elevated and if no improvement then please obtain sonogram of Left elbow to rule out fluid collection   3. Pain management as needed     d/w patient and Family at the bed side     Attending Attestation::    Spent more than 45 minutes on total encounter, more than 50 % of the visit was spent counseling and/or coordinating care by the Attending physician. Patient is a 30y old  Male with no significant PMHx/PSHx, received COVID-19 vaccine to Left shoulder 5 days ago in am of Monday and in evening on same day hit his elbow at work and reports following morning noticed Left elbow redness and swelling. Patient also relates subjective chills but no other symptoms. Able to  move elbow in all range of motion but with pain to the elbow and cannot fully extend elbow. Pt denies insect bite, burn or any other stimulus. ON admission, he has no fever but Leukocytosis. The Xray of Left elbow shows Marked soft tissue swelling olecranon region and posterior forearm. Correlate clinically for bursitis and/or cellulitis. He has starte don Unasyn, and the ID consult requested to assist with further evaluation and antibiotic management.     # Left Elbow cellulitis /Bursitis   # Leukocytosis  - resolved     would recommend:    1. Continue Unasyn and Clindamycin inpatient   2. May change to oral Augmentin 875 mg q 12hours and Doxycycline 100 mg q 12hours to continue until 6/7/21  3. Keep Left elbow elevated   4. OOB to chair     Attending Attestation::    Spent more than 35 minutes on total encounter, more than 50 % of the visit was spent counseling and/or coordinating care by the Attending physician.

## 2021-05-31 NOTE — DISCHARGE NOTE PROVIDER - NSFOLLOWUPCLINICS_GEN_ALL_ED_FT
[facility]  Internal Medicine  [address] [place] [address]  Phone: [phone]  Fax: [phone]

## 2021-05-31 NOTE — PROGRESS NOTE ADULT - SUBJECTIVE AND OBJECTIVE BOX
Patient is seen and examined at the bed side, is afebrile. The Blood cultures are negative to date.       REVIEW OF SYSTEMS: All other review systems are negative      ALLERGIES: No Known Allergies      Vital Signs Last 24 Hrs  T(C): 36.8 (31 May 2021 14:31), Max: 36.9 (30 May 2021 20:05)  T(F): 98.2 (31 May 2021 14:31), Max: 98.4 (30 May 2021 20:05)  HR: 78 (31 May 2021 14:31) (71 - 78)  BP: 107/62 (31 May 2021 14:31) (107/62 - 121/68)  BP(mean): --  RR: 17 (31 May 2021 14:31) (17 - 17)  SpO2: 96% (31 May 2021 14:31) (95% - 96%)      PHYSICAL EXAM:  GENERAL: Not in distress   CHEST/LUNG: Not using accessory muscles  HEART: s1 and s2 present  ABDOMEN:  Nontender and  Nondistended  EXTREMITIES: Left elbow red, swollen, warmth and tender to touch   CNS: Awake and Alert      LABS:  No new labs                        13.4   9.54  )-----------( 183      ( 30 May 2021 07:42 )             40.4                         13.0   11.02 )-----------( 160      ( 29 May 2021 17:59 )             39.3       0530    139  |  107  |  10  ----------------------------<  95  3.9   |  26  |  0.74    Ca    8.8      30 May 2021 07:42  Phos  2.9       Mg     2.4         TPro  7.4  /  Alb  3.1<L>  /  TBili  1.1  /  DBili  x   /  AST  14  /  ALT  20  /  AlkPhos  83      138  |  103  |  12  ----------------------------<  83  4.6   |  25  |  0.89    Ca    9.4      29 May 2021 10:54    TPro  8.3  /  Alb  3.5  /  TBili  1.3<H>  /  DBili  x   /  AST  14  /  ALT  25  /  AlkPhos  100        Urinalysis Basic - ( 29 May 2021 16:17 )  Color: Yellow / Appearance: Clear / S.025 / pH: x  Gluc: x / Ketone: Trace  / Bili: Negative / Urobili: 1   Blood: x / Protein: 15 / Nitrite: Negative   Leuk Esterase: Trace / RBC: 5-10 /HPF / WBC 3-5 /HPF   Sq Epi: x / Non Sq Epi: Occasional /HPF / Bacteria: Trace /HPF        MEDICATIONS  (STANDING):    ampicillin/sulbactam  IVPB      ampicillin/sulbactam  IVPB 3 Gram(s) IV Intermittent every 6 hours  clindamycin IVPB      clindamycin IVPB 900 milliGRAM(s) IV Intermittent every 8 hours        RADIOLOGY & ADDITIONAL TESTS:    21: US Duplex Venous Upper Ext Ltd, Left (21 @ 16:56) >No evidence of LEFT upper extremity deep venous thrombosis.    21 : Xray Elbow AP + Lateral + Oblique, Left (21 @ 10:37) No fracture or dislocation. Joint spaces preserved. No focal bone destruction. Marked soft tissue swelling olecranon region and posterior forearm. Correlate clinically for bursitis and/or cellulitis. No soft tissue gas or opaque foreign body.        MICROBIOLOGY DATA:    Culture - Blood (21 @ 20:58)   Specimen Source: .Blood Blood-Venous   Culture Results: No growth to date.     Culture - Blood (21 @ 20:58)   Specimen Source: .Blood Blood-Venous   Culture Results: No growth to date.     Culture - Urine (21 @ 20:57)   Specimen Source: .Urine Clean Catch (Midstream)   Culture Results: No growth     COVID-19 Fritz Domain Antibody (21 @ 11:33)   COVID-19 Fritz Domain Antibody Result: >250.00    COVID-19 PCR . (21 @ 10:54)   COVID-19 PCR: NotDetec             Patient is seen and examined at the bed side, is afebrile. The swelling, redness and tenderness of Left elbow is improving.       REVIEW OF SYSTEMS: All other review systems are negative      ALLERGIES: No Known Allergies      Vital Signs Last 24 Hrs  T(C): 36.8 (31 May 2021 14:31), Max: 36.9 (30 May 2021 20:05)  T(F): 98.2 (31 May 2021 14:31), Max: 98.4 (30 May 2021 20:05)  HR: 78 (31 May 2021 14:31) (71 - 78)  BP: 107/62 (31 May 2021 14:31) (107/62 - 121/68)  BP(mean): --  RR: 17 (31 May 2021 14:31) (17 - 17)  SpO2: 96% (31 May 2021 14:31) (95% - 96%)      PHYSICAL EXAM:  GENERAL: Not in distress   CHEST/LUNG: Not using accessory muscles  HEART: s1 and s2 present  ABDOMEN:  Nontender and  Nondistended  EXTREMITIES: Left elbow redness, swelling and tenderness has improved  CNS: Awake and Alert        LABS:  No new labs                        13.4   9.54  )-----------( 183      ( 30 May 2021 07:42 )             40.4                         13.0   11.02 )-----------( 160      ( 29 May 2021 17:59 )             39.3           139  |  107  |  10  ----------------------------<  95  3.9   |  26  |  0.74    Ca    8.8      30 May 2021 07:42  Phos  2.9       Mg     2.4         TPro  7.4  /  Alb  3.1<L>  /  TBili  1.1  /  DBili  x   /  AST  14  /  ALT  20  /  AlkPhos  83      138  |  103  |  12  ----------------------------<  83  4.6   |  25  |  0.89    Ca    9.4      29 May 2021 10:54    TPro  8.3  /  Alb  3.5  /  TBili  1.3<H>  /  DBili  x   /  AST  14  /  ALT  25  /  AlkPhos  100        Urinalysis Basic - ( 29 May 2021 16:17 )  Color: Yellow / Appearance: Clear / S.025 / pH: x  Gluc: x / Ketone: Trace  / Bili: Negative / Urobili: 1   Blood: x / Protein: 15 / Nitrite: Negative   Leuk Esterase: Trace / RBC: 5-10 /HPF / WBC 3-5 /HPF   Sq Epi: x / Non Sq Epi: Occasional /HPF / Bacteria: Trace /HPF        MEDICATIONS  (STANDING):    ampicillin/sulbactam  IVPB      ampicillin/sulbactam  IVPB 3 Gram(s) IV Intermittent every 6 hours  clindamycin IVPB      clindamycin IVPB 900 milliGRAM(s) IV Intermittent every 8 hours        RADIOLOGY & ADDITIONAL TESTS:    21: US Duplex Venous Upper Ext Ltd, Left (21 @ 16:56) >No evidence of LEFT upper extremity deep venous thrombosis.    21 : Xray Elbow AP + Lateral + Oblique, Left (21 @ 10:37) No fracture or dislocation. Joint spaces preserved. No focal bone destruction. Marked soft tissue swelling olecranon region and posterior forearm. Correlate clinically for bursitis and/or cellulitis. No soft tissue gas or opaque foreign body.        MICROBIOLOGY DATA:    Culture - Blood (21 @ 20:58)   Specimen Source: .Blood Blood-Venous   Culture Results: No growth to date.     Culture - Blood (21 @ 20:58)   Specimen Source: .Blood Blood-Venous   Culture Results: No growth to date.     Culture - Urine (21 @ 20:57)   Specimen Source: .Urine Clean Catch (Midstream)   Culture Results: No growth     COVID-19 Fritz Domain Antibody (21 @ 11:33)   COVID-19 Fritz Domain Antibody Result: >250.00    COVID-19 PCR . (21 @ 10:54)   COVID-19 PCR: NotDetec

## 2021-05-31 NOTE — DISCHARGE NOTE PROVIDER - HOSPITAL COURSE
Patient is a 30y old  Male with no significant PMHx/PSHx, received COVID-19 vaccine to Left shoulder 5 days ago in am of Monday and in evening on same day hit his elbow at work and reports following morning noticed Left elbow redness and swelling. Patient also relates subjective chills but no other symptoms. Able to  move elbow in all range of motion but with pain to the elbow and cannot fully extend elbow. Pt denies insect bite, burn or any other stimulus. ON admission, he has no fever but Leukocytosis. The Xray of Left elbow shows Marked soft tissue swelling olecranon region and posterior forearm. Correlate clinically for bursitis and/or cellulitis. He was started on  Unasyn, ID  was consulted requested to assist with further evaluation and antibiotic management. Blood cultures and urine cultures are negative to date. Patient's WBC resolved.     Patient will continue on????    INCOMPLETE  PENDING FINAL ID RECS Patient is a 30y old  Male with no significant PMHx/PSHx, received COVID-19 vaccine to Left shoulder 5 days to hospital admission and on the same day in evening hit his elbow at work and reported following morning noticed Left elbow redness and swelling. Xray of Left elbow showed Marked soft tissue swelling olecranon region and posterior forearm. Correlate clinically for bursitis and/or cellulitis. Patient followed by ID for antibiotic management. Blood cultures and urine cultures are negative to date. Patient's WBC resolved.     Patient will be discharged on oral antibiotic therapy, change Unasyn to Augmentin and Doxycycline .   Patient is medical optimized for discharge without services      Patient is a 30y old  Male with no significant PMHx/PSHx, received COVID-19 vaccine to Left shoulder 5 days to hospital admission and on the same day in evening hit his elbow at work and reported following morning noticed Left elbow redness and swelling.  Admitted to medicine for bursitis and/or cellulitis. Xray of Left elbow showed Marked soft tissue swelling olecranon region and posterior forearm. Patient followed by ID for antibiotic management. Blood cultures and urine cultures are negative to date. Patient's Leukocytosis improved.     Patient will be discharged on oral antibiotic therapy, change Unasyn to Augmentin and Doxycycline .   Patient is medical optimized for discharge without services      Patient is a 30y old  Male with no significant PMHx/PSHx, received COVID-19 vaccine to Left shoulder 5 days to hospital admission and on the same day in evening hit his elbow at work and reported following morning noticed Left elbow redness and swelling.  Admitted to medicine for bursitis and/or cellulitis. Xray of Left elbow showed Marked soft tissue swelling olecranon region and posterior forearm. Patient followed by ID for antibiotic management. Blood cultures and urine cultures are negative to date. Patient's Leukocytosis improved.     Patient will be discharged on oral antibiotic therapy Augmentin and Doxycycline .   Patient is medical optimized for discharge without services      Patient is a 30y old  Male with no significant PMHx/PSHx, received COVID-19 vaccine to Left shoulder 5 days to hospital admission and on the same day in evening hit his elbow at work and reported following morning noticed Left elbow redness and swelling.  Admitted to medicine for bursitis and/or cellulitis. no sepsis. leukocytosis 2nd to trauma, no fever.Xray of Left elbow showed Marked soft tissue swelling olecranon region and posterior forearm. Patient followed by ID for antibiotic management. Blood cultures and urine cultures are negative to date. Patient's Leukocytosis improved.     Patient will be discharged on oral antibiotic therapy Augmentin and Doxycycline .   Patient is medical optimized for discharge without services      Patient is a 30y old  Male with no significant PMHx/PSHx, received COVID-19 vaccine to Left shoulder 5 days to hospital admission and on the same day in evening hit his elbow at work and reported following morning noticed Left elbow redness and swelling.  Admitted to medicine for bursitis and/or cellulitis. sepsis present on admission. Xray of Left elbow showed Marked soft tissue swelling olecranon region and posterior forearm. Patient followed by ID for antibiotic management. Blood cultures and urine cultures are negative to date. Patient's Leukocytosis improved.     Patient will be discharged on oral antibiotic therapy Augmentin and Doxycycline .   Patient is medical optimized for discharge without services

## 2021-05-31 NOTE — DISCHARGE NOTE PROVIDER - NSDCCPCAREPLAN_GEN_ALL_CORE_FT
PRINCIPAL DISCHARGE DIAGNOSIS  Diagnosis: Olecranon bursitis of left elbow  Assessment and Plan of Treatment: Continue to take your medications as prescribed. Follow up with your PCP for further management.      SECONDARY DISCHARGE DIAGNOSES  Diagnosis: Cellulitis of left upper extremity  Assessment and Plan of Treatment: Take all of your antibiotics as ordered.  Call your Health Care Provider within two days of arriving home to make a follow up appointment within one week.  If the affected cellulitic area increases in redness, warmth, pain or swelling call your Health Care Provider.  If you develop fever, chills, and/or malaise, call your Health Care Provider.       PRINCIPAL DISCHARGE DIAGNOSIS  Diagnosis: Olecranon bursitis of left elbow  Assessment and Plan of Treatment: You presented to the hospital with pain/redness and swelling on the left elbow. Xray done revealed Bursitis/Cellulitis of the left upper extremity. You were treated with intervenous antibiotics Continue to take your medications as prescribed. Follow up with your PCP for further management.      SECONDARY DISCHARGE DIAGNOSES  Diagnosis: Cellulitis of left upper extremity  Assessment and Plan of Treatment: Take all of your antibiotics as ordered.  Call your Health Care Provider within two days of arriving home to make a follow up appointment within one week.  If the affected cellulitic area increases in redness, warmth, pain or swelling call your Health Care Provider.  If you develop fever, chills, and/or malaise, call your Health Care Provider.       PRINCIPAL DISCHARGE DIAGNOSIS  Diagnosis: Olecranon bursitis of left elbow  Assessment and Plan of Treatment: You presented to the hospital with pain/redness and swelling on the left elbow. Xray done revealed Bursitis/Cellulitis of the left upper extremity. You were treated with intervenous antibiotics and discharged on oral antibiotic. You will start on oral antibiotic take your medications as prescribed. Follow up with your PCP for further management.      SECONDARY DISCHARGE DIAGNOSES  Diagnosis: Cellulitis of left upper extremity  Assessment and Plan of Treatment: Take all of your antibiotics as ordered.  Call your Health Care Provider within two days of arriving home to make a follow up appointment within one week.  If the affected cellulitic area increases in redness, warmth, pain or swelling call your Health Care Provider.  If you develop fever, chills, and/or malaise, call your Health Care Provider.

## 2021-06-01 VITALS
HEART RATE: 65 BPM | RESPIRATION RATE: 18 BRPM | DIASTOLIC BLOOD PRESSURE: 58 MMHG | OXYGEN SATURATION: 97 % | SYSTOLIC BLOOD PRESSURE: 114 MMHG | TEMPERATURE: 98 F

## 2021-06-01 LAB
ANION GAP SERPL CALC-SCNC: 9 MMOL/L — SIGNIFICANT CHANGE UP (ref 5–17)
BUN SERPL-MCNC: 12 MG/DL — SIGNIFICANT CHANGE UP (ref 7–18)
CALCIUM SERPL-MCNC: 8.9 MG/DL — SIGNIFICANT CHANGE UP (ref 8.4–10.5)
CHLORIDE SERPL-SCNC: 108 MMOL/L — SIGNIFICANT CHANGE UP (ref 96–108)
CO2 SERPL-SCNC: 25 MMOL/L — SIGNIFICANT CHANGE UP (ref 22–31)
CREAT SERPL-MCNC: 0.8 MG/DL — SIGNIFICANT CHANGE UP (ref 0.5–1.3)
GLUCOSE SERPL-MCNC: 98 MG/DL — SIGNIFICANT CHANGE UP (ref 70–99)
HCT VFR BLD CALC: 42.4 % — SIGNIFICANT CHANGE UP (ref 39–50)
HGB BLD-MCNC: 13.9 G/DL — SIGNIFICANT CHANGE UP (ref 13–17)
MCHC RBC-ENTMCNC: 29.8 PG — SIGNIFICANT CHANGE UP (ref 27–34)
MCHC RBC-ENTMCNC: 32.8 GM/DL — SIGNIFICANT CHANGE UP (ref 32–36)
MCV RBC AUTO: 90.8 FL — SIGNIFICANT CHANGE UP (ref 80–100)
NRBC # BLD: 0 /100 WBCS — SIGNIFICANT CHANGE UP (ref 0–0)
PLATELET # BLD AUTO: 258 K/UL — SIGNIFICANT CHANGE UP (ref 150–400)
POTASSIUM SERPL-MCNC: 4.1 MMOL/L — SIGNIFICANT CHANGE UP (ref 3.5–5.3)
POTASSIUM SERPL-SCNC: 4.1 MMOL/L — SIGNIFICANT CHANGE UP (ref 3.5–5.3)
RBC # BLD: 4.67 M/UL — SIGNIFICANT CHANGE UP (ref 4.2–5.8)
RBC # FLD: 12.4 % — SIGNIFICANT CHANGE UP (ref 10.3–14.5)
SODIUM SERPL-SCNC: 142 MMOL/L — SIGNIFICANT CHANGE UP (ref 135–145)
WBC # BLD: 8.21 K/UL — SIGNIFICANT CHANGE UP (ref 3.8–10.5)
WBC # FLD AUTO: 8.21 K/UL — SIGNIFICANT CHANGE UP (ref 3.8–10.5)

## 2021-06-01 PROCEDURE — 87086 URINE CULTURE/COLONY COUNT: CPT

## 2021-06-01 PROCEDURE — 86140 C-REACTIVE PROTEIN: CPT

## 2021-06-01 PROCEDURE — 80053 COMPREHEN METABOLIC PANEL: CPT

## 2021-06-01 PROCEDURE — 84550 ASSAY OF BLOOD/URIC ACID: CPT

## 2021-06-01 PROCEDURE — 85025 COMPLETE CBC W/AUTO DIFF WBC: CPT

## 2021-06-01 PROCEDURE — 82746 ASSAY OF FOLIC ACID SERUM: CPT

## 2021-06-01 PROCEDURE — 83540 ASSAY OF IRON: CPT

## 2021-06-01 PROCEDURE — 83735 ASSAY OF MAGNESIUM: CPT

## 2021-06-01 PROCEDURE — 86769 SARS-COV-2 COVID-19 ANTIBODY: CPT

## 2021-06-01 PROCEDURE — 82607 VITAMIN B-12: CPT

## 2021-06-01 PROCEDURE — 85045 AUTOMATED RETICULOCYTE COUNT: CPT

## 2021-06-01 PROCEDURE — 80048 BASIC METABOLIC PNL TOTAL CA: CPT

## 2021-06-01 PROCEDURE — 83605 ASSAY OF LACTIC ACID: CPT

## 2021-06-01 PROCEDURE — 84100 ASSAY OF PHOSPHORUS: CPT

## 2021-06-01 PROCEDURE — 36415 COLL VENOUS BLD VENIPUNCTURE: CPT

## 2021-06-01 PROCEDURE — 85652 RBC SED RATE AUTOMATED: CPT

## 2021-06-01 PROCEDURE — 82140 ASSAY OF AMMONIA: CPT

## 2021-06-01 PROCEDURE — 99285 EMERGENCY DEPT VISIT HI MDM: CPT

## 2021-06-01 PROCEDURE — 83036 HEMOGLOBIN GLYCOSYLATED A1C: CPT

## 2021-06-01 PROCEDURE — 80061 LIPID PANEL: CPT

## 2021-06-01 PROCEDURE — 85027 COMPLETE CBC AUTOMATED: CPT

## 2021-06-01 PROCEDURE — 87635 SARS-COV-2 COVID-19 AMP PRB: CPT

## 2021-06-01 PROCEDURE — 81001 URINALYSIS AUTO W/SCOPE: CPT

## 2021-06-01 PROCEDURE — 73080 X-RAY EXAM OF ELBOW: CPT

## 2021-06-01 PROCEDURE — 82728 ASSAY OF FERRITIN: CPT

## 2021-06-01 PROCEDURE — 80307 DRUG TEST PRSMV CHEM ANLYZR: CPT

## 2021-06-01 PROCEDURE — 93971 EXTREMITY STUDY: CPT

## 2021-06-01 PROCEDURE — 84443 ASSAY THYROID STIM HORMONE: CPT

## 2021-06-01 PROCEDURE — 83550 IRON BINDING TEST: CPT

## 2021-06-01 PROCEDURE — 83880 ASSAY OF NATRIURETIC PEPTIDE: CPT

## 2021-06-01 PROCEDURE — 87040 BLOOD CULTURE FOR BACTERIA: CPT

## 2021-06-01 RX ORDER — ACETAMINOPHEN 500 MG
2 TABLET ORAL
Qty: 0 | Refills: 0 | DISCHARGE
Start: 2021-06-01

## 2021-06-01 RX ADMIN — AMPICILLIN SODIUM AND SULBACTAM SODIUM 200 GRAM(S): 250; 125 INJECTION, POWDER, FOR SUSPENSION INTRAMUSCULAR; INTRAVENOUS at 11:33

## 2021-06-01 RX ADMIN — Medication 100 MILLIGRAM(S): at 13:22

## 2021-06-01 RX ADMIN — Medication 100 MILLIGRAM(S): at 05:04

## 2021-06-01 RX ADMIN — AMPICILLIN SODIUM AND SULBACTAM SODIUM 200 GRAM(S): 250; 125 INJECTION, POWDER, FOR SUSPENSION INTRAMUSCULAR; INTRAVENOUS at 05:04

## 2021-06-01 NOTE — DISCHARGE NOTE NURSING/CASE MANAGEMENT/SOCIAL WORK - PATIENT PORTAL LINK FT
You can access the FollowMyHealth Patient Portal offered by [facility] by registering at the following website: [email or web address]. By joining [employer]’s FollowMyHealth portal, you will also be able to view your health information using other applications (apps) compatible with our system.

## 2021-06-01 NOTE — PROGRESS NOTE ADULT - SUBJECTIVE AND OBJECTIVE BOX
Patient is a 30y old  Male who presents with a chief complaint of cellulitis (31 May 2021 17:22)    pt seen in icu [  ], reg med floor [ x  ], bed [ x ], chair at bedside [   ], a+o x3 [ x ], lethargic [  ],    nad [ x ]      Allergies    No Known Allergies        Vitals    T(F): 98.4 (06-01-21 @ 05:27), Max: 99.3 (05-31-21 @ 20:08)  HR: 65 (06-01-21 @ 05:27) (65 - 80)  BP: 114/58 (06-01-21 @ 05:27) (107/62 - 116/71)  RR: 18 (06-01-21 @ 05:27) (16 - 18)  SpO2: 97% (06-01-21 @ 05:27) (96% - 97%)  Wt(kg): --  CAPILLARY BLOOD GLUCOSE          Labs                          13.9   8.21  )-----------( 258      ( 01 Jun 2021 06:05 )             42.4       06-01    142  |  108  |  12  ----------------------------<  98  4.1   |  25  |  0.80    Ca    8.9      01 Jun 2021 06:05    TPro  7.4  /  Alb  3.1<L>  /  TBili  1.1  /  DBili  x   /  AST  14  /  ALT  20  /  AlkPhos  83  05-30            .Blood Blood-Venous  05-29 @ 20:58   No growth to date.  --  --      .Urine Clean Catch (Midstream)  05-29 @ 20:57   No growth  --  --          Radiology Results      Meds    MEDICATIONS  (STANDING):  ampicillin/sulbactam  IVPB      ampicillin/sulbactam  IVPB 3 Gram(s) IV Intermittent every 6 hours  clindamycin IVPB      clindamycin IVPB 900 milliGRAM(s) IV Intermittent every 8 hours      MEDICATIONS  (PRN):  acetaminophen   Tablet .. 650 milliGRAM(s) Oral every 4 hours PRN Mild Pain (1 - 3)  oxyCODONE    IR 5 milliGRAM(s) Oral every 8 hours PRN Severe Pain (7 - 10)  traMADol 50 milliGRAM(s) Oral every 6 hours PRN Moderate Pain (4 - 6)      Physical Exam    Neuro :  no focal deficits  Respiratory: CTA B/L  CV: RRR, S1S2, no murmurs,   Abdominal: Soft, NT, ND +BS,  Extremities: No le edema, + peripheral pulses, lue edema and erythema much improved, full rom,      ASSESSMENT    Olecranon bursitis of left elbow  sepsis,   left ue cellulitis,   h/o COVID-19 vaccine to Left shoulder 5 days ago        PLAN    cont unasyn and clinda,   id f/u    blood and ucx neg noted above  cont pain control  cont current meds,  d/c plan once cleared by id             Patient is a 30y old  Male who presents with a chief complaint of cellulitis (31 May 2021 17:22)    pt seen in icu [  ], reg med floor [ x  ], bed [ x ], chair at bedside [   ], a+o x3 [ x ], lethargic [  ],    nad [ x ]      Allergies    No Known Allergies        Vitals    T(F): 98.4 (06-01-21 @ 05:27), Max: 99.3 (05-31-21 @ 20:08)  HR: 65 (06-01-21 @ 05:27) (65 - 80)  BP: 114/58 (06-01-21 @ 05:27) (107/62 - 116/71)  RR: 18 (06-01-21 @ 05:27) (16 - 18)  SpO2: 97% (06-01-21 @ 05:27) (96% - 97%)  Wt(kg): --  CAPILLARY BLOOD GLUCOSE          Labs                          13.9   8.21  )-----------( 258      ( 01 Jun 2021 06:05 )             42.4       06-01    142  |  108  |  12  ----------------------------<  98  4.1   |  25  |  0.80    Ca    8.9      01 Jun 2021 06:05    TPro  7.4  /  Alb  3.1<L>  /  TBili  1.1  /  DBili  x   /  AST  14  /  ALT  20  /  AlkPhos  83  05-30            .Blood Blood-Venous  05-29 @ 20:58   No growth to date.  --  --      .Urine Clean Catch (Midstream)  05-29 @ 20:57   No growth  --  --          Radiology Results      Meds    MEDICATIONS  (STANDING):  ampicillin/sulbactam  IVPB      ampicillin/sulbactam  IVPB 3 Gram(s) IV Intermittent every 6 hours  clindamycin IVPB      clindamycin IVPB 900 milliGRAM(s) IV Intermittent every 8 hours      MEDICATIONS  (PRN):  acetaminophen   Tablet .. 650 milliGRAM(s) Oral every 4 hours PRN Mild Pain (1 - 3)  oxyCODONE    IR 5 milliGRAM(s) Oral every 8 hours PRN Severe Pain (7 - 10)  traMADol 50 milliGRAM(s) Oral every 6 hours PRN Moderate Pain (4 - 6)      Physical Exam    Neuro :  no focal deficits  Respiratory: CTA B/L  CV: RRR, S1S2, no murmurs,   Abdominal: Soft, NT, ND +BS,  Extremities: No le edema, + peripheral pulses, lue edema and erythema much improved, full rom,      ASSESSMENT    Olecranon bursitis of left elbow  sepsis,   left ue cellulitis,   h/o COVID-19 vaccine to Left shoulder 5 days ago        PLAN      id f/u    blood and ucx neg noted above   change abx to oral Augmentin 875 mg q 12hours and Doxycycline 100 mg q 12hours to continue until 6/7/21  cont pain control  cont current meds,  pt stable

## 2021-06-01 NOTE — CHART NOTE - NSCHARTNOTEFT_GEN_A_CORE
Patient for discharge home today. Discharge instructions, medication indication/compliance and outpatient follow-up discussed with patient using  ID [id number] with good understanding.

## 2021-06-03 LAB
CULTURE RESULTS: SIGNIFICANT CHANGE UP
CULTURE RESULTS: SIGNIFICANT CHANGE UP
SPECIMEN SOURCE: SIGNIFICANT CHANGE UP
SPECIMEN SOURCE: SIGNIFICANT CHANGE UP